# Patient Record
Sex: FEMALE | Race: WHITE
[De-identification: names, ages, dates, MRNs, and addresses within clinical notes are randomized per-mention and may not be internally consistent; named-entity substitution may affect disease eponyms.]

---

## 2020-08-06 ENCOUNTER — HOSPITAL ENCOUNTER (OUTPATIENT)
Dept: HOSPITAL 47 - RADXRMAIN | Age: 35
Discharge: HOME | End: 2020-08-06
Payer: COMMERCIAL

## 2020-08-06 DIAGNOSIS — S80.02XA: Primary | ICD-10-CM

## 2020-08-06 NOTE — XR
EXAMINATION TYPE: XR knee complete LT

 

DATE OF EXAM: 8/6/2020

 

CLINICAL HISTORY: Slip and fall injury with pain.

 

TECHNIQUE:  Three views of the left knee are obtained.

 

COMPARISON: None.

 

FINDINGS:  There is no acute fracture/dislocation evident in left knee. Mild tricompartment joint spa
ce loss without significant spurring.  The overlying soft tissue appears unremarkable.

 

IMPRESSION:  There is no acute fracture or dislocation in the left knee.